# Patient Record
Sex: MALE | Race: OTHER
[De-identification: names, ages, dates, MRNs, and addresses within clinical notes are randomized per-mention and may not be internally consistent; named-entity substitution may affect disease eponyms.]

---

## 2019-09-03 ENCOUNTER — HOSPITAL ENCOUNTER (OUTPATIENT)
Dept: HOSPITAL 61 - MRI | Age: 59
Discharge: HOME | End: 2019-09-03
Attending: PHYSICAL MEDICINE & REHABILITATION
Payer: COMMERCIAL

## 2019-09-03 DIAGNOSIS — M51.27: ICD-10-CM

## 2019-09-03 DIAGNOSIS — M47.816: Primary | ICD-10-CM

## 2019-09-03 DIAGNOSIS — M48.02: ICD-10-CM

## 2019-09-03 DIAGNOSIS — M12.88: ICD-10-CM

## 2019-09-03 PROCEDURE — 72148 MRI LUMBAR SPINE W/O DYE: CPT

## 2019-09-03 NOTE — RAD
LUMBAR SPINE WO CONTRAST

 

History: Low back pain. Bilateral leg weakness.

 

Technique: Multiplanar, multi sequential MR imaging was performed of the 

lumbar spine.

 

Comparison: None

 

Findings: 

Normal vertebral body height and alignment. No fracture. Congenitally 

small spinal canal.

 

Conus terminates at the normal location. No evidence of nerve root 

clumping.

 

Degenerative endplate edema T12 inferior endplate and L5-S1. No pathologic

marrow replacing process.

 

Tiny left inferior renal cyst.

 

L1-L2:  Small posterior disc bulge. Mild facet arthropathy. No canal 

narrowing. No neuroforaminal narrowing.

 

L2-L3:  Broad-based posterior disc bulge with superimposed central disc 

protrusion and annular fissure. Moderate canal narrowing. Moderate 

bilateral neural foraminal narrowing.

 

L3-L4:  Broad-based posterior disc bulge. Mild canal narrowing. Mild facet

arthropathy. Moderate to severe bilateral neural foraminal narrowing.

 

L4-L5:  Broad-based posterior disc bulge. Moderate facet arthropathy. Mild

to moderate canal narrowing. Moderate to severe right and severe left 

neuroforaminal narrowing.

 

L5-S1:  Broad-based posterior disc bulge with superimposed left 

subarticular/foraminal disc extrusion. Severe left subarticular recess 

narrowing with compression of the descending left S1 nerve root. Mild 

bilateral neural foraminal narrowing. Mild facet arthropathy.

 

Impression: 

1.  Moderate multilevel lumbar spondylosis with congenitally small spinal 

canal.

2.  Multilevel canal narrowing most prominent at L2-L3, L3-L4 and L4-L5.

3.  L5-S1 left subarticular/foraminal disc extrusion contributing to 

compression of the left S1 nerve root within the subarticular recess. 

Correlate for radiculopathy.

4.  Multilevel neural foraminal narrowing most severe bilateral L3-L4 and 

left L4-L5.

 

Electronically signed by: Stefan Jones DO (9/3/2019 1:02 PM) [id number]